# Patient Record
Sex: MALE | Race: WHITE
[De-identification: names, ages, dates, MRNs, and addresses within clinical notes are randomized per-mention and may not be internally consistent; named-entity substitution may affect disease eponyms.]

---

## 2017-04-04 ENCOUNTER — HOSPITAL ENCOUNTER (INPATIENT)
Dept: HOSPITAL 10 - E/R | Age: 52
LOS: 3 days | Discharge: HOME | DRG: 194 | End: 2017-04-07
Attending: FAMILY MEDICINE | Admitting: FAMILY MEDICINE
Payer: MEDICAID

## 2017-04-04 VITALS
HEIGHT: 67 IN | WEIGHT: 167.77 LBS | BODY MASS INDEX: 26.33 KG/M2 | BODY MASS INDEX: 26.33 KG/M2 | WEIGHT: 167.77 LBS | HEIGHT: 67 IN

## 2017-04-04 DIAGNOSIS — J18.9: Primary | ICD-10-CM

## 2017-04-04 DIAGNOSIS — J45.909: ICD-10-CM

## 2017-04-04 DIAGNOSIS — E78.5: ICD-10-CM

## 2017-04-04 DIAGNOSIS — K92.0: ICD-10-CM

## 2017-04-04 DIAGNOSIS — J20.9: ICD-10-CM
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
## 2017-04-04 DIAGNOSIS — F17.210: ICD-10-CM

## 2017-04-04 DIAGNOSIS — J44.9: ICD-10-CM

## 2017-04-04 LAB
ADD SCAN DIFF: NO
ALBUMIN SERPL-MCNC: 4.4 G/DL (ref 3.3–4.9)
ALBUMIN/GLOB SERPL: 1.37 {RATIO}
ALP SERPL-CCNC: 126 IU/L (ref 42–121)
ALT SERPL-CCNC: 34 IU/L (ref 13–69)
ANION GAP SERPL CALC-SCNC: 15 MMOL/L (ref 8–16)
APTT BLD: 35.4 SEC (ref 25–35)
AST SERPL-CCNC: 28 IU/L (ref 15–46)
BASOPHILS # BLD AUTO: 0.1 10^3/UL (ref 0–0.1)
BASOPHILS NFR BLD: 0.3 % (ref 0–2)
BILIRUB DIRECT SERPL-MCNC: 0 MG/DL (ref 0–0.2)
BILIRUB SERPL-MCNC: 0.3 MG/DL (ref 0.2–1.3)
BUN SERPL-MCNC: 12 MG/DL (ref 7–20)
CALCIUM SERPL-MCNC: 8.6 MG/DL (ref 8.4–10.2)
CHLORIDE SERPL-SCNC: 104 MMOL/L (ref 97–110)
CO2 SERPL-SCNC: 24 MMOL/L (ref 21–31)
CREAT SERPL-MCNC: 0.86 MG/DL (ref 0.61–1.24)
EOSINOPHIL # BLD: 0.1 10^3/UL (ref 0–0.5)
EOSINOPHIL NFR BLD: 0.5 % (ref 0–7)
ERYTHROCYTE [DISTWIDTH] IN BLOOD BY AUTOMATED COUNT: 12.5 % (ref 11.5–14.5)
GLOBULIN SER-MCNC: 3.2 G/DL (ref 1.3–3.2)
GLUCOSE SERPL-MCNC: 89 MG/DL (ref 70–220)
HCT VFR BLD CALC: 41.9 % (ref 42–52)
HGB BLD-MCNC: 14.6 G/DL (ref 14–18)
INR PPP: 1.01
LYMPHOCYTES # BLD AUTO: 4.9 10^3/UL (ref 0.8–2.9)
LYMPHOCYTES NFR BLD AUTO: 27.4 % (ref 15–51)
MCH RBC QN AUTO: 31.8 PG (ref 29–33)
MCHC RBC AUTO-ENTMCNC: 34.8 G/DL (ref 32–37)
MCV RBC AUTO: 91.3 FL (ref 82–101)
MONOCYTES # BLD: 1.2 10^3/UL (ref 0.3–0.9)
MONOCYTES NFR BLD: 6.5 % (ref 0–11)
NEUTROPHILS # BLD: 11.6 10^3/UL (ref 1.6–7.5)
NEUTROPHILS NFR BLD AUTO: 65 % (ref 39–77)
NRBC # BLD MANUAL: 0 10^3/UL (ref 0–0)
NRBC BLD QL: 0 /100WBC (ref 0–0)
PLATELET # BLD: 211 10^3/UL (ref 140–415)
PMV BLD AUTO: 10.2 FL (ref 7.4–10.4)
POTASSIUM SERPL-SCNC: 3.9 MMOL/L (ref 3.5–5.1)
PROT SERPL-MCNC: 7.6 G/DL (ref 6.1–8.1)
PROTHROMBIN TIME: 13.3 SEC (ref 12.2–14.2)
PT RATIO: 1
RBC # BLD AUTO: 4.59 10^6/UL (ref 4.7–6.1)
SODIUM SERPL-SCNC: 139 MMOL/L (ref 135–144)
WBC # BLD AUTO: 17.9 10^3/UL (ref 4.8–10.8)

## 2017-04-04 PROCEDURE — 81003 URINALYSIS AUTO W/O SCOPE: CPT

## 2017-04-04 PROCEDURE — 36415 COLL VENOUS BLD VENIPUNCTURE: CPT

## 2017-04-04 PROCEDURE — 83605 ASSAY OF LACTIC ACID: CPT

## 2017-04-04 PROCEDURE — 80061 LIPID PANEL: CPT

## 2017-04-04 PROCEDURE — 96365 THER/PROPH/DIAG IV INF INIT: CPT

## 2017-04-04 PROCEDURE — 71260 CT THORAX DX C+: CPT

## 2017-04-04 PROCEDURE — 85610 PROTHROMBIN TIME: CPT

## 2017-04-04 PROCEDURE — 87086 URINE CULTURE/COLONY COUNT: CPT

## 2017-04-04 PROCEDURE — 84443 ASSAY THYROID STIM HORMONE: CPT

## 2017-04-04 PROCEDURE — 71010: CPT

## 2017-04-04 PROCEDURE — 94640 AIRWAY INHALATION TREATMENT: CPT

## 2017-04-04 PROCEDURE — 87536 HIV-1 QUANT&REVRSE TRNSCRPJ: CPT

## 2017-04-04 PROCEDURE — 85025 COMPLETE CBC W/AUTO DIFF WBC: CPT

## 2017-04-04 PROCEDURE — 83036 HEMOGLOBIN GLYCOSYLATED A1C: CPT

## 2017-04-04 PROCEDURE — 81001 URINALYSIS AUTO W/SCOPE: CPT

## 2017-04-04 PROCEDURE — 86360 T CELL ABSOLUTE COUNT/RATIO: CPT

## 2017-04-04 PROCEDURE — 87400 INFLUENZA A/B EACH AG IA: CPT

## 2017-04-04 PROCEDURE — 87040 BLOOD CULTURE FOR BACTERIA: CPT

## 2017-04-04 PROCEDURE — 94664 DEMO&/EVAL PT USE INHALER: CPT

## 2017-04-04 PROCEDURE — 83735 ASSAY OF MAGNESIUM: CPT

## 2017-04-04 PROCEDURE — 93005 ELECTROCARDIOGRAM TRACING: CPT

## 2017-04-04 PROCEDURE — 84100 ASSAY OF PHOSPHORUS: CPT

## 2017-04-04 PROCEDURE — 80048 BASIC METABOLIC PNL TOTAL CA: CPT

## 2017-04-04 PROCEDURE — 80053 COMPREHEN METABOLIC PANEL: CPT

## 2017-04-04 PROCEDURE — 84439 ASSAY OF FREE THYROXINE: CPT

## 2017-04-04 PROCEDURE — 85730 THROMBOPLASTIN TIME PARTIAL: CPT

## 2017-04-04 PROCEDURE — 84484 ASSAY OF TROPONIN QUANT: CPT

## 2017-04-04 NOTE — RADRPT
PROCEDURE:   XR Chest. 

 

CLINICAL INDICATION:   Possible sepsis.

 

TECHNIQUE:   Single frontal view  of the chest was obtained 

 

COMPARISON:   Chest dated 12/23/2016.

 

FINDINGS:

The heart and mediastinum are within normal limits.  

Mild nodular air space disease over the right mid lung, and this may represent bronchiolitis.  Consi
ravi CT correlation.  Lungs otherwise clear.

There is no pleural effusion or pneumothorax.   

 

IMPRESSION:

1.  Mild nodular air space disease over the right mid lung and consider CT correlation.

2.  Otherwise, no evident acute cardiopulmonary disease.

 

RPTAT: UU

_____________________________________________ 

Physician Guido           Date    Time 

Electronically viewed and signed by Physician Guido on 04/04/2017 23:18 

 

D:  04/04/2017 23:18  T:  04/04/2017 23:18

RS/

## 2017-04-05 VITALS — TEMPERATURE: 99.9 F

## 2017-04-05 VITALS — DIASTOLIC BLOOD PRESSURE: 62 MMHG | RESPIRATION RATE: 20 BRPM | SYSTOLIC BLOOD PRESSURE: 120 MMHG

## 2017-04-05 VITALS — HEART RATE: 94 BPM

## 2017-04-05 VITALS — DIASTOLIC BLOOD PRESSURE: 78 MMHG | RESPIRATION RATE: 21 BRPM | SYSTOLIC BLOOD PRESSURE: 129 MMHG

## 2017-04-05 VITALS — SYSTOLIC BLOOD PRESSURE: 117 MMHG | RESPIRATION RATE: 18 BRPM | DIASTOLIC BLOOD PRESSURE: 71 MMHG

## 2017-04-05 LAB
ADD SCAN DIFF: NO
ADD UMIC: YES
ANION GAP SERPL CALC-SCNC: 14 MMOL/L (ref 8–16)
BASOPHILS # BLD AUTO: 0.1 10^3/UL (ref 0–0.1)
BASOPHILS NFR BLD: 0.3 % (ref 0–2)
BUN SERPL-MCNC: 10 MG/DL (ref 7–20)
CALCIUM SERPL-MCNC: 8.1 MG/DL (ref 8.4–10.2)
CHLORIDE SERPL-SCNC: 107 MMOL/L (ref 97–110)
CO2 SERPL-SCNC: 23 MMOL/L (ref 21–31)
COLOR UR: (no result)
CREAT SERPL-MCNC: 0.78 MG/DL (ref 0.61–1.24)
EOSINOPHIL # BLD: 0.1 10^3/UL (ref 0–0.5)
EOSINOPHIL NFR BLD: 0.6 % (ref 0–7)
ERYTHROCYTE [DISTWIDTH] IN BLOOD BY AUTOMATED COUNT: 12.4 % (ref 11.5–14.5)
GLUCOSE SERPL-MCNC: 89 MG/DL (ref 70–220)
GLUCOSE UR STRIP-MCNC: NEGATIVE %
HCT VFR BLD CALC: 37.7 % (ref 42–52)
HGB BLD-MCNC: 13.2 G/DL (ref 14–18)
KETONES UR STRIP.AUTO-MCNC: NEGATIVE MG/DL
LYMPHOCYTES # BLD AUTO: 4.2 10^3/UL (ref 0.8–2.9)
LYMPHOCYTES NFR BLD AUTO: 27.1 % (ref 15–51)
MCH RBC QN AUTO: 31.9 PG (ref 29–33)
MCHC RBC AUTO-ENTMCNC: 35 G/DL (ref 32–37)
MCV RBC AUTO: 91.1 FL (ref 82–101)
MONOCYTES # BLD: 1 10^3/UL (ref 0.3–0.9)
MONOCYTES NFR BLD: 6.3 % (ref 0–11)
NEUTROPHILS # BLD: 10 10^3/UL (ref 1.6–7.5)
NEUTROPHILS NFR BLD AUTO: 65.3 % (ref 39–77)
NITRITE UR QL STRIP.AUTO: NEGATIVE
NRBC # BLD MANUAL: 0 10^3/UL (ref 0–0)
NRBC BLD QL: 0 /100WBC (ref 0–0)
PLATELET # BLD: 192 10^3/UL (ref 140–415)
PMV BLD AUTO: 9.8 FL (ref 7.4–10.4)
POTASSIUM SERPL-SCNC: 3.8 MMOL/L (ref 3.5–5.1)
RBC # BLD AUTO: 4.14 10^6/UL (ref 4.7–6.1)
RBC # UR AUTO: (no result) /UL
RBC #/AREA URNS HPF: (no result) /HPF
SODIUM SERPL-SCNC: 140 MMOL/L (ref 135–144)
TROPONIN I SERPL-MCNC: < 0.01 NG/ML (ref 0–0.12)
URINE BILIRUBIN (DIP): NEGATIVE
URINE TOTAL PROTEIN (DIP): NEGATIVE
UROBILINOGEN UR STRIP-ACNC: (no result) (ref 0.1–1)
WBC # BLD AUTO: 15.3 10^3/UL (ref 4.8–10.8)
WBC # UR STRIP: NEGATIVE /UL

## 2017-04-05 RX ADMIN — ENOXAPARIN SODIUM SCH MG: 100 INJECTION SUBCUTANEOUS at 10:02

## 2017-04-05 RX ADMIN — IPRATROPIUM BROMIDE AND ALBUTEROL SULFATE SCH ML: .5; 3 SOLUTION RESPIRATORY (INHALATION) at 08:17

## 2017-04-05 RX ADMIN — IPRATROPIUM BROMIDE AND ALBUTEROL SULFATE SCH ML: .5; 3 SOLUTION RESPIRATORY (INHALATION) at 13:59

## 2017-04-05 RX ADMIN — IPRATROPIUM BROMIDE AND ALBUTEROL SULFATE SCH ML: .5; 3 SOLUTION RESPIRATORY (INHALATION) at 05:42

## 2017-04-05 RX ADMIN — IPRATROPIUM BROMIDE AND ALBUTEROL SULFATE SCH ML: .5; 3 SOLUTION RESPIRATORY (INHALATION) at 20:02

## 2017-04-05 RX ADMIN — FAMOTIDINE SCH MG: 20 TABLET ORAL at 20:23

## 2017-04-05 RX ADMIN — FAMOTIDINE SCH MG: 20 TABLET ORAL at 09:57

## 2017-04-05 RX ADMIN — IPRATROPIUM BROMIDE AND ALBUTEROL SULFATE SCH ML: .5; 3 SOLUTION RESPIRATORY (INHALATION) at 17:00

## 2017-04-05 NOTE — ERA
ER Documentation


Chief Complaint


Date/Time


DATE: 17 


TIME: 00:21


Chief Complaint


FEVER WITH HEMOPTYSIS AND CHEST PAIN X 1 WEEK. HIV+





HPI


This is a 51-year-old male with fever and hemoptysis and chest pain for 1 week.

  Patient is HIV positive.  Cough is also mildly productive.  Patient says 

unknown viral load.  Unknown CD4 count.  No other current complaints.  Mild 

associated shortness of breath with coughing.





ROS


All systems reviewed and are negative except as per history of present illness.





Medications


Home Meds


Active Scripts


Azithromycin* (Azithromycin*) 250 Mg Tablet, 500 MG PO ONCE, #1 TAB


   Prov:ANA SAWYER MD         16


Azithromycin* (Azithromycin*) 250 Mg Tablet, 250 MG PO DAILY, #4 TAB


   Prov:ANA SAWYER MD         16


Albuterol Sulfate* (Proair HFA*) 8.5 Gm Hfa.aer.ad, 2 PUFF INH Q6H Y for 

WHEEZING AND SOB, #1 INHALER


   Prov:ANA SAWYER MD         16


Reported Medications


Elvitegr/Cobicist/Emtric/Tenof (STRIBILD TABLET) 1 Each Tablet, 1 EACH PO DAILY

, TAB


   16





Allergies


Allergies:  


Coded Allergies:  


     morphine (Verified  Allergy, Unknown, rash, 16)





PMhx/Soc


History of Surgery:  No


Hx Respiratory Disorders:  Yes (asthma)


Hx Miscellaneous Medical Probl:  Yes (HIV)


Hx Alcohol Use:  No


Hx Substance Use:  No


Hx Tobacco Use:  Yes


Smoking Status:  Light tobacco smoker





Physical Exam


Vitals





Vital Signs








  Date Time  Temp Pulse Resp B/P Pulse Ox O2 Delivery O2 Flow Rate FiO2


 


17 23:52  96 20  97   21


 


17 23:00 99.9       


 


17 22:55  95 18 130/82 99 Room Air  


 


17 19:53 100.5 117 20 138/79 97   








Physical Exam


Const:  []


Head:   Atraumatic 


Eyes:    Normal Conjunctiva


ENT:    Normal External Ears, Nose and Mouth.


Neck:               Full range of motion..~ No meningismus.


Resp:    Clear to auscultation bilaterally


Cardio:    Regular rate and rhythm, no murmurs


Abd:    Soft, non tender, non distended. Normal bowel sounds


Skin:    No petechiae or rashes


Back:    No midline or flank tenderness


Ext:    No cyanosis, or edema


Neur:    Awake and alert


Psych:    Normal Mood and Affect


Result Diagram:  


17 2315                                                                    

            175





Results 24 hrs





 Laboratory Tests








Test


  17


23:15


 


White Blood Count 17.910^3/ul 


 


Red Blood Count 4.5910^6/ul 


 


Hemoglobin 14.6g/dl 


 


Hematocrit 41.9% 


 


Mean Corpuscular Volume 91.3fl 


 


Mean Corpuscular Hemoglobin 31.8pg 


 


Mean Corpuscular Hemoglobin


Concent 34.8g/dl 


 


 


Red Cell Distribution Width 12.5% 


 


Platelet Count 15238^3/UL 


 


Mean Platelet Volume 10.2fl 


 


Neutrophils % 65.0% 


 


Lymphocytes % 27.4% 


 


Monocytes % 6.5% 


 


Eosinophils % 0.5% 


 


Basophils % 0.3% 


 


Nucleated Red Blood Cells % 0.0/100WBC 


 


Neutrophils # 11.610^3/ul 


 


Lymphocytes # 4.910^3/ul 


 


Monocytes # 1.210^3/ul 


 


Eosinophils # 0.110^3/ul 


 


Basophils # 0.110^3/ul 


 


Nucleated Red Blood Cells # 0.010^3/ul 


 


Prothrombin Time 13.3Sec 


 


Prothrombin Time Ratio 1.0 


 


INR International Normalized


Ratio 1.01 


 


 


Activated Partial


Thromboplast Time 35.4Sec 


 


 


Sodium Level 139mmol/L 


 


Potassium Level 3.9mmol/L 


 


Chloride Level 104mmol/L 


 


Carbon Dioxide Level 24mmol/L 


 


Anion Gap 15 


 


Blood Urea Nitrogen 12mg/dl 


 


Creatinine 0.86mg/dl 


 


Glucose Level 89mg/dl 


 


Lactic Acid Level 0.9mmol/L 


 


Calcium Level 8.6mg/dl 


 


Total Bilirubin 0.3mg/dl 


 


Direct Bilirubin 0.00mg/dl 


 


Indirect Bilirubin 0.3mg/dl 


 


Aspartate Amino Transf


(AST/SGOT) 28IU/L 


 


 


Alanine Aminotransferase


(ALT/SGPT) 34IU/L 


 


 


Alkaline Phosphatase 126IU/L 


 


Troponin I < 0.010ng/ml 


 


Total Protein 7.6g/dl 


 


Albumin 4.4g/dl 


 


Globulin 3.20g/dl 


 


Albumin/Globulin Ratio 1.37 








 Current Medications








 Medications


  (Trade)  Dose


 Ordered  Sig/Loc


 Route


 PRN Reason  Start Time


 Stop Time Status Last Admin


Dose Admin


 


 Sodium Chloride


  (NS)  2,430 ml  BOLUS OVER 2 HOURS STAT


 IV*


   17 22:46


 17 22:48 DC 17 23:12


 


 


 Acetaminophen


  (Tylenol Tab)  650 mg  ONCE  STAT


 PO


   17 22:46


 17 22:48 DC 17 23:11


 


 


 Albuterol


  (Proventil


 0.083% (Neb))  5 mg  ONCE  STAT


 NEB


   17 23:45


 17 23:46 DC 17 23:51


 


 


 Ipratropium


 Bromide


  (Atrovent 0.02%


  (Neb))  0.5 mg  ONCE  STAT


 NEB


   17 23:45


 17 23:46 DC 17 23:51


 


 


 Ipratropium


 Bromide


  (Atrovent 0.02%


  (Neb))  0.5 mg  STK-MED ONCE


 .ROUTE


   17 23:46


 17 23:47 DC  


 


 


 Albuterol


  (Proventil 0.5%


  (Neb))  2.5 mg  STK-MED ONCE


 .ROUTE


   17 23:46


 17 23:47 DC  


 











Procedures/MDM


EKG: 


Rate/Rhythm:             [Normal Sinus Rhythm]


QRS, ST, T-waves:    [No changes consistent w/ acute ischemia]


Impression:      [No evidence of ischemia or arrhythmia]





Chest X-ray 1V Interpreted by me:


Soft Tissue:                                               No acute 

abnormalities


Bones:                                                    No acute abnormalities


Mediastinum/Cardiac Silhouette/Lungs:     Nodular airspace disease.  Impression

: Possible HIV related pneumonia





Medical decision-makin-year-old gentleman who has evidence of airspace 

disease.  Patient will be admitted to hospitalist.  Started on Rocephin 

azithromycin and Bactrim to cover for pneumocystis disease.





Departure


Diagnosis:  


 Primary Impression:  


 Pneumonia


 Qualified Code:  J18.9 - Pneumonia due to infectious organism, unspecified 

laterality, unspecified part of lung


Condition:  Serious











WINSOME BETTENCOURT 2017 00:22

## 2017-04-05 NOTE — RADRPT
PROCEDURE:   CT Chest with contrast. 

 

CLINICAL INDICATION:   Abnormal CXR, CT Recommended. Febrile pt with HIV

 

TECHNIQUE:   CT scan of the chest with contrast was performed on a multidetector high-resolution CT 
scan.  The patient was scanned following the uncomplicated intravenous administration of 80 ml Omnip
aque-300. Coronal and sagittal reformatted images were obtained from the axial source images. Standa
rd CT scan of the chest with contrast protocols were performed.  

 

The total exam CTDI equals 11.46 mGy and the total exam DLP equals 477.41 mGy-cm. 

 

One or more of the following dose reduction techniques were used:

- Automated exposure control.

- Adjustment of the mA and/or kV according to patient size.

Use of iterative reconstruction technique.

 

COMPARISON:   Chest series 04/04/2017 

 

FINDINGS:   

 

There are multiple blebs involving the peripheral upper lungs and apices.  Mild dependent lung atele
ctasis.  On the previous recent chest x-ray there was described mild nodular air space disease of th
e right mid lung which probably represents small pulmonary vessels on end.  Specifically there is no
 evidence of pulmonary nodules bilaterally. The lungs are otherwise clear without evidence of infilt
rates.  The heart is within normal limits in size without evidence of pericardial effusion.  No evid
ence of pleural effusions or pneumothoraces. There is mild paratracheal lymphadenopathy.  No evidenc
e of hilar, axillary or paraclavicular lymphadenopathy.  The aorta is unremarkable.  Images of the u
pper abdomen are unremarkable.  There is degenerative changes lower cervical thoracic and upper lumb
ar spine.  No acute osseous findings.  No osteoblastic/osteolytic lesions.

 

IMPRESSION:

 

1.  Previous chest x-ray 04/04/2017 indicated nodular air space disease involving the right mid lung
 likely representing pulmonary vessels on end.

2.  Specifically, no evidence of pulmonary nodules.

3.  Mild paratracheal lymphadenopathy.  No evidence of hilar or axillary lymphadenopathy.

4.  Multiple blebs involving the peripheral upper lungs and apical regions.  Dependent atelectasis.

5.  No evidence of thoracic effusions.

 

 

RPTAT:AAJJ

_____________________________________________ 

Physician Yassine           Date    Time 

Electronically viewed and signed by Physician Yassine on 04/05/2017 09:12 

 

D:  04/05/2017 09:12  T:  04/05/2017 09:12

/

## 2017-04-05 NOTE — CONS
DATE OF ADMISSION: 04/05/2017

DATE OF CONSULTATION:  04/05/2017

 

 

 

TYPE OF CONSULTATION:  Infectious Disease.

 

REASON FOR CONSULTATION:  Antibiotic management.

 

HISTORY OF PRESENT ILLNESS:  Khang Lobo is a 51-year-old  male who comes into the em
ergency room with fever and hemoptysis as well as chest pain for 1 week.  He  has a history of HIV p
ositivity.  The patient does not know his CD4 count or his viral load.  He is currently on Stribild 
which is tenofovir, emtricitabine, cobicistat and elvitegravir which he has been sign on for a perio
d of time 1 tablet a day.  His problems also include asthma.  He is a light tobacco smoker.  On admi
ssion, his white count was 17.9, H and H 14.6 and 41.9, platelet count 211,000.  BUN and creatinine 
12/0.86.

 

PAST MEDICAL HISTORY:  Operations as outlined.

 

FAMILY HISTORY:  Noncontributory.

 

SOCIAL HISTORY:  Does not smoke, drink or abuse drugs.

 

ALLERGIES:  NONE TO PENICILLIN, SULFA OR FOODS.

 

MEDICATIONS:  Per chart.

 

REVIEW OF SYSTEMS:  As per HPI.

 

PHYSICAL EXAMINATION:

GENERAL:  The patient is a well-developed, well-nourished male who is alert, responsive, in no acute
 distress.

VITAL SIGNS:  Stable.  He is afebrile.  T-max 100.5.

SKIN:  Without generalized rash.

HEENT:  Within normal limits.

NECK:  Supple.

LYMPH NODES:  None palpable.

CHEST:  Decreased breath sounds at the bases.

HEART:  Without murmur or gallop.

ABDOMEN:  Soft, nontender, without organosplenomegaly or masses.

EXTREMITIES:  Without cyanosis, clubbing, or edema.

RECTAL AND GENITAL:  Deferred.

NEUROLOGIC:  No focal neurological abnormalities.  

 

Chest x-ray shows no acute abnormalities.  He does have a nodular airspace disease.

 

IMPRESSION AND PLAN:  Possible HIV related pneumonia.  Patient was started on Rocephin, azithromycin
 and Bactrim.  A CT scan of the chest was done which shows nodular airspace disease involving the ri
ght mid lung, likely representing pulmonary vessels.  Specifically, no evidence of pulmonary nodules
.  Mild paratracheal lymphadenopathy, no evidence of hilar or axillary lymphadenopathy.  Multiple bl
ebs involving peripheral upper lungs in apical regions, dependent atelectasis.  No evidence of thora
cic effusions.  So it is unlikely that these are actually nodules.  Microbiology:  His influenza A a
nd B are negative.  I doubt very much that he has pneumocystis.  We have to await the results of his
 CD4 count and viral load.  I see lymphocyte subset and a HIV RNA by PCR was ordered as well.  Cee apple was placed also on azithromycin and ceftriaxone as I noted, as well as received 1 dose of IV Bact
rim, but I do not think we are treating pneumocystis.  I will dictate my findings to the hospitalist
.

 

 

Dictated By: JERROLD DREYER MD JD/ALLISON

DD:    04/05/2017 17:34:23

DT:    04/05/2017 18:42:56

Conf#: [id number]

DID#:  [id number]

## 2017-04-05 NOTE — HP
Date/Time of Note


Date/Time of Note


DATE: 4/5/17 


TIME: 00:46





Assessment/Plan


VTE Prophylaxis


VTE Prophylaxis Intervention:  anti-embolic stocking





Assessment/Plan


Assessment/Plan


1)  Fever and Cough with Leukocytosis of 17.9, but no Left Shift. 1VCXR shows 

no evidence of acute cardiopulmonary disease, so it is not clear that there is 

a pneumonia going on, so doubt Opportunistic Pulmonary Infection


-  Admit to Med-Surg


-  Monitor and Evaluate as indicated


-  CT Chest to further evaluate the abnormal findings on the CXR


-  CBC in AM


2)  Hematemesis


-  Consider GI Referral vs Pulmonary Referral as it is not 100% clear if oral 

bleeding is from vomiting or from coughing/ GI vs Pulmonary source


3)  Asthma with possible Asthmatic Bronchitis


-  DuoNeb Q 4 hours


-  Consider Steroid blast


4)  HIV Positive


-  Viral Load. Need to assess patient's current immune status





HPI/ROS


Admit Date/Time


Admit Date/Time


04/05/17   0009





Hx of Present Illness


Patient is a 51 year old  male with HIV who gets his medical care by 

Dr. Osorio at the [facility] [place]. He presents with a complaint 

of fever, chills and cough for the past week. He states this is his 4th visit 

to the ER in the past 4 months. He states his Viral Load is 1400 and he takes 

his Antiretroviral medication daily. He has never had Pneumonia, but he does 

have Asthma. He smokes 2 cigarettes daily.





He mentions that he has been nauseated but has not vomited. His phlegm has had 

blood init for the past 15 days. He denies any red blood MT or black-colored 

stools





ROS


General:  Admits:  Fever, Chills


              Denies:  Poor Appetite, Generalized Body Aches


Eyes:  Admits:


          Denies:  Blurry Vision, Double Vision


HENT:  Admits:


           Denies:  Ear Pain/Pressure, Runny/Stuffy Nose, Sore Throat


Cardiovascular:  Admits:


                        Denies:  Chest Pain, Palpitations, Leg Swelling


Pulmonary:  Admits:  Cough, Phlegm with blood for the past 15 days


                  Denies:   Wheeze, Shortness of Breath


Gastrointestinal:  Admits:  Nausea


                          Denies:  Abdominal Pain, Vomiting, Diarrhea, Blood in 

Stool, Black-Colored Stool


Urogenital:  Admits:


                  Denies:  Burning with Urination, Urinary Frequency, Blood in 

Urine


Musculoskeletal:  Admits:


                          Denies:  Joint Pain, Joint Swelling, Muscle Pain


Neurological:  Admits:


                     Denies:  Headache, Dizziness, Numbness, Tingling, Shooting 

Pains


Integumentary:  Admits:


                         Denies:  Rash, Itch





PMH/Family/Social


Past Medical History


HIV; Asthma





Past Surgical History


Past Surgical Hx:  appendectomy (30 years ago), other (Eye Surgery)





Social History


Alcohol Use:  none


Smoking Status:  Light tobacco smoker (2 Cigarettes per day)


Drug Use:  none





Exam/Review of Systems


Vital Signs


Vitals





 Vital Signs








  Date Time  Temp Pulse Resp B/P Pulse Ox O2 Delivery O2 Flow Rate FiO2


 


4/4/17 23:52  96 20  97   21


 


4/4/17 23:00 99.9       


 


4/4/17 22:55    130/82  Room Air  











Exam


Exam


General:  Sleeping 51 year old [place]-Speaking male, rouses easily. Appears 

tired, but non-toxic and in no acute distress.


Eyes:  Sclera White, EOMI


HENT:  Normocephalic/Atraumatic, External Ears/Nose Normal, Moist Mucus 

Membranes


Neck:  Supple, Trachea Midline


Cardiovascular:  Normal Rate, Regular Rhythm, Normal S1 and S2, No Murmur, No 

Extra Sounds. Radial pulse +2/4. No pedal Edema.


Pulmonary:  Decreased airflow throughout, Normal Respiratory Effort, but easy 

and multiple dry coughs in a row with even a moderate inspiration.Coarse sounds 

notec, but No Rales, Rhonchi or Wheezes


Gastrointestinal:  Normoactive Bowel Sounds, Soft, Non-Tender/Non-Distended, No 

Hepatosplenomegaly Appreciated, No Pulsatile Masses


Urogenital:  Deferred


Musculoskeletal:  Normal Muscle Bulk and Tone


Neurological:  CN  II - XII Grossly Intact, Non-Focal, Speech Normal


Integumentary:  Normal Moisture and Temperature, Good Turgor, No Jaundice, No 

Rash.


Lymphatic:  No Cervical, Supraclavicular, Axillary or Inguinal Lymphadenopathy


Psychiatric:  Appropriate Mood and Affect, Good Eye Contact





Labs


Result Diagram:  


4/4/17 2315                                                                    

            4/4/17 2315








Medications


Medications


Home Meds


Active Scripts


Azithromycin* (Azithromycin*) 250 Mg Tablet, 500 MG PO ONCE, #1 TAB


   Prov:ANA SAWYER MD         12/23/16


Azithromycin* (Azithromycin*) 250 Mg Tablet, 250 MG PO DAILY, #4 TAB


   Prov:ANA SAWYER MD         12/23/16


Albuterol Sulfate* (Proair HFA*) 8.5 Gm Hfa.aer.ad, 2 PUFF INH Q6H Y for 

WHEEZING AND SOB, #1 INHALER


   Prov:ANA SAWYER MD         12/23/16


Reported Medications


Elvitegr/Cobicist/Emtric/Tenof (STRIBILD TABLET) 1 Each Tablet, 1 EACH PO DAILY

, TAB


   12/23/16


 Current Medications


Trimethoprim/ Sulfamethoxazole/ Dextrose (Bactrim/D5W) 510 ml @  350 mls/hr 

ONCE  ONCE IVPB ;  Start 4/5/17 at 00:30;  Stop 4/5/17 at 01:57





Procedures


Procedures





 Laboratory Tests








Test


  4/4/17


23:15


 


White Blood Count 17.910^3/ul 


 


Red Blood Count 4.5910^6/ul 


 


Hemoglobin 14.6g/dl 


 


Hematocrit 41.9% 


 


Mean Corpuscular Volume 91.3fl 


 


Mean Corpuscular Hemoglobin 31.8pg 


 


Mean Corpuscular Hemoglobin


Concent 34.8g/dl 


 


 


Red Cell Distribution Width 12.5% 


 


Platelet Count 14498^3/UL 


 


Mean Platelet Volume 10.2fl 


 


Neutrophils % 65.0% 


 


Lymphocytes % 27.4% 


 


Monocytes % 6.5% 


 


Eosinophils % 0.5% 


 


Basophils % 0.3% 


 


Nucleated Red Blood Cells % 0.0/100WBC 


 


Neutrophils # 11.610^3/ul 


 


Lymphocytes # 4.910^3/ul 


 


Monocytes # 1.210^3/ul 


 


Eosinophils # 0.110^3/ul 


 


Basophils # 0.110^3/ul 


 


Nucleated Red Blood Cells # 0.010^3/ul 


 


Prothrombin Time 13.3Sec 


 


Prothrombin Time Ratio 1.0 


 


INR International Normalized


Ratio 1.01 


 


 


Activated Partial


Thromboplast Time 35.4Sec 


 


 


Sodium Level 139mmol/L 


 


Potassium Level 3.9mmol/L 


 


Chloride Level 104mmol/L 


 


Carbon Dioxide Level 24mmol/L 


 


Anion Gap 15 


 


Blood Urea Nitrogen 12mg/dl 


 


Creatinine 0.86mg/dl 


 


Glucose Level 89mg/dl 


 


Lactic Acid Level 0.9mmol/L 


 


Calcium Level 8.6mg/dl 


 


Total Bilirubin 0.3mg/dl 


 


Direct Bilirubin 0.00mg/dl 


 


Indirect Bilirubin 0.3mg/dl 


 


Aspartate Amino Transf


(AST/SGOT) 28IU/L 


 


 


Alanine Aminotransferase


(ALT/SGPT) 34IU/L 


 


 


Alkaline Phosphatase 126IU/L 


 


Troponin I < 0.010ng/ml 


 


Total Protein 7.6g/dl 


 


Albumin 4.4g/dl 


 


Globulin 3.20g/dl 


 


Albumin/Globulin Ratio 1.37 








EKG:


Rate/Rhythm:             Sinus Tach at 115 BPM


QRS, ST, T-waves:    No Acute Changes


Impression:      Abnormal EKG








PROCEDURE:   XR Chest. 


 


CLINICAL INDICATION:   Possible sepsis.


 


TECHNIQUE:   Single frontal view  of the chest was obtained 


 


COMPARISON:   Chest dated 12/23/2016.


 


FINDINGS:


The heart and mediastinum are within normal limits.  


Mild nodular air space disease over the right mid lung, and this may represent 

bronchiolitis.  Consider CT correlation.  Lungs otherwise clear.


There is no pleural effusion or pneumothorax.   


 


IMPRESSION:


1.  Mild nodular air space disease over the right mid lung and consider CT 

correlation.


2.  Otherwise, no evident acute cardiopulmonary disease.











CHRISTINE SHANKS DO Apr 5, 2017 00:56

## 2017-04-05 NOTE — CONS
DATE OF ADMISSION: 04/05/2017

DATE OF CONSULTATION:  04/05/2017

 

 

 

ADDENDUM

 

On further questioning, the patient states that his CD4 count is 1400, so he has an excellent CD4 co
unt and, therefore, pneumocystis is out of the question.  He probably just has a viral syndrome and 
currently is on azithromycin and ceftriaxone.

 

 

Dictated By: JERROLD DREYER MD JD/ALLISON

DD:    04/05/2017 17:38:30

DT:    04/05/2017 18:44:15

Conf#: [id number]

DID#:  [id number]

## 2017-04-06 VITALS — RESPIRATION RATE: 16 BRPM | SYSTOLIC BLOOD PRESSURE: 125 MMHG | DIASTOLIC BLOOD PRESSURE: 61 MMHG

## 2017-04-06 VITALS — SYSTOLIC BLOOD PRESSURE: 129 MMHG | DIASTOLIC BLOOD PRESSURE: 77 MMHG | RESPIRATION RATE: 20 BRPM

## 2017-04-06 LAB
ADD SCAN DIFF: NO
ANION GAP SERPL CALC-SCNC: 14 MMOL/L (ref 8–16)
BASOPHILS # BLD AUTO: 0 10^3/UL (ref 0–0.1)
BASOPHILS NFR BLD: 0.4 % (ref 0–2)
BUN SERPL-MCNC: 14 MG/DL (ref 7–20)
CALCIUM SERPL-MCNC: 8.6 MG/DL (ref 8.4–10.2)
CHLORIDE SERPL-SCNC: 108 MMOL/L (ref 97–110)
CHOLEST SERPL-MCNC: 188 MG/DL (ref 100–200)
CHOLEST/HDLC SERPL: 5.3 RATIO
CO2 SERPL-SCNC: 23 MMOL/L (ref 21–31)
CREAT SERPL-MCNC: 0.82 MG/DL (ref 0.61–1.24)
EOSINOPHIL # BLD: 0.1 10^3/UL (ref 0–0.5)
EOSINOPHIL NFR BLD: 1 % (ref 0–7)
ERYTHROCYTE [DISTWIDTH] IN BLOOD BY AUTOMATED COUNT: 12.8 % (ref 11.5–14.5)
GLUCOSE SERPL-MCNC: 158 MG/DL (ref 70–220)
HCT VFR BLD CALC: 36.2 % (ref 42–52)
HDLC SERPL-MCNC: 35 MG/DL (ref 28–71)
HGB BLD-MCNC: 12.8 G/DL (ref 14–18)
LYMPHOCYTES # BLD AUTO: 3.7 10^3/UL (ref 0.8–2.9)
LYMPHOCYTES NFR BLD AUTO: 32.9 % (ref 15–51)
MAGNESIUM SERPL-MCNC: 2.2 MG/DL (ref 1.7–2.5)
MCH RBC QN AUTO: 32.7 PG (ref 29–33)
MCHC RBC AUTO-ENTMCNC: 35.4 G/DL (ref 32–37)
MCV RBC AUTO: 92.3 FL (ref 82–101)
MONOCYTES # BLD: 0.6 10^3/UL (ref 0.3–0.9)
MONOCYTES NFR BLD: 5.5 % (ref 0–11)
NEUTROPHILS # BLD: 6.7 10^3/UL (ref 1.6–7.5)
NEUTROPHILS NFR BLD AUTO: 59.8 % (ref 39–77)
NRBC # BLD MANUAL: 0 10^3/UL (ref 0–0)
NRBC BLD QL: 0 /100WBC (ref 0–0)
PHOSPHATE SERPL-MCNC: 3.1 MG/DL (ref 2.5–4.9)
PLATELET # BLD: 189 10^3/UL (ref 140–415)
PMV BLD AUTO: 10 FL (ref 7.4–10.4)
POTASSIUM SERPL-SCNC: 3.4 MMOL/L (ref 3.5–5.1)
RBC # BLD AUTO: 3.92 10^6/UL (ref 4.7–6.1)
SODIUM SERPL-SCNC: 142 MMOL/L (ref 135–144)
TRIGL SERPL-MCNC: 175 MG/DL (ref 0–149)
TSH SERPL-ACNC: 3.49 MIU/L (ref 0.47–4.68)
WBC # BLD AUTO: 11.2 10^3/UL (ref 4.8–10.8)

## 2017-04-06 RX ADMIN — IPRATROPIUM BROMIDE AND ALBUTEROL SULFATE SCH ML: .5; 3 SOLUTION RESPIRATORY (INHALATION) at 20:16

## 2017-04-06 RX ADMIN — IPRATROPIUM BROMIDE AND ALBUTEROL SULFATE SCH ML: .5; 3 SOLUTION RESPIRATORY (INHALATION) at 01:01

## 2017-04-06 RX ADMIN — ENOXAPARIN SODIUM SCH MG: 100 INJECTION SUBCUTANEOUS at 08:32

## 2017-04-06 RX ADMIN — OMEGA-3 FATTY ACIDS CAP DELAYED RELEASE 1000 MG SCH MG: 1000 CAPSULE DELAYED RELEASE at 20:37

## 2017-04-06 RX ADMIN — IPRATROPIUM BROMIDE AND ALBUTEROL SULFATE SCH ML: .5; 3 SOLUTION RESPIRATORY (INHALATION) at 17:21

## 2017-04-06 RX ADMIN — FAMOTIDINE SCH MG: 20 TABLET ORAL at 20:37

## 2017-04-06 RX ADMIN — IPRATROPIUM BROMIDE AND ALBUTEROL SULFATE SCH ML: .5; 3 SOLUTION RESPIRATORY (INHALATION) at 04:28

## 2017-04-06 RX ADMIN — IPRATROPIUM BROMIDE AND ALBUTEROL SULFATE SCH ML: .5; 3 SOLUTION RESPIRATORY (INHALATION) at 12:55

## 2017-04-06 RX ADMIN — IPRATROPIUM BROMIDE AND ALBUTEROL SULFATE SCH ML: .5; 3 SOLUTION RESPIRATORY (INHALATION) at 08:33

## 2017-04-06 RX ADMIN — FAMOTIDINE SCH MG: 20 TABLET ORAL at 08:25

## 2017-04-06 NOTE — PN
DATE:  04/06/2017

 

 

TIME OF EVALUATION:  10:45 a.m. 

 

SUBJECTIVE:  Improved dyspnea.  Complaints of cough.

 

OBJECTIVE DATA:

VITAL SIGNS:  Temperature 98.6, pulse rate 86, respiratory rate 18, blood 
pressure 125/61, oxygen saturation 96% on room air.

GENERAL:  This is a well-built, well-nourished  male lying in bed.

HEENT:  Head normocephalic and atraumatic.  Eyes:  Anicteric sclerae.  
Conjunctivae clear.  ENT:  Nasal septum is midline.  Oral mucosa is dry.

NECK:  Supple.  No JVD noticed.

RESPIRATORY:  Bilaterally diminished breath sounds.  No use of accessory 
muscles of respiration.  No wheezing.

CARDIAC:  Regular rate and rhythm.  S1 and S2 heard.

ABDOMEN:  Soft, nontender, and nondistended.  Bowel sounds positive in all 4 
quadrants.

GENITOURINARY:  Deferred.

EXTREMITIES:  No cyanosis, no clubbing, no edema.  Peripheral pulses are 
palpable.

NEUROLOGIC:  The patient is awake, alert, and oriented.  Cranial nerves are 
grossly intact.

 

LABORATORY AND DIAGNOSTIC DATA:  WBC 11.2, hemoglobin 12.8, hematocrit 36.2, 
platelet count 189.  Sodium 142, potassium 3.4, chloride 108, carbon dioxide 23
, anion gap 14, BUN 14, creatinine 0.82, glucose 150, calcium 8.6, phosphorus 
3.1, magnesium 2.2.

 



ASSESSMENT AND PLAN:

1.  Possible underlying acute tracheobronchitis.  Continue antibiotics.  No 
evidence of any pneumonia as per CT scan.  CT scan reveals emphysematous blebs.
  The patient is a chronic smoker.  Continue inhaled bronchodilators.  Pending 
CD4 count and viral load.  The patient less likely have any PCP.



2.  Dyslipidemia with elevated triglycerides and suboptimal LDL.  Will start 
the patient on fish oil.



3.  Possible underlying chronic obstructive pulmonary disease.  Continue 
inhaled bronchodilators.



4.  Nicotine use.  Cessation advised.



5.  HIV. Continue antiretrovirals.



6.  Fluid, electrolytes, and nutrition.  Low cholesterol diet.



7.  Deep venous thrombosis prophylaxis. Subcutaneous Lovenox.



8.  Gastrointestinal prophylaxis.  Histamine 2 receptor blockers. 

 

Plan: Continue antibiotics.  Cultures so far negative.  Will await CD4 count 
and viral load to be back.  Will await further recommendations from infectious 
disease.  





Case discussed with Dr. Burns.

 

 

PATRICIA BURNS MD, AM/ALLISON

DD:    04/06/2017 10:56:28

DT:    04/06/2017 11:43:59

Conf#: [id number]

DID#:  [id number]

 

MTDD

## 2017-04-06 NOTE — PN
DATE:  04/06/2017

 

 

INFECTIOUS DISEASE PROGRESS NOTE 

 

SUBJECTIVE:  No acute changes.  The patient is alert, comfortable on room air.  No fevers, no shortn
ess of breath.  WBC 11.2.  No shift, no bands.  BUN 14, creatinine 0.82.

 

MICROBIOLOGY:  Blood culture, urine culture and influenza swab negative.  CD4 count pending.

 

ANTIMICROBIALS:  The patient is on:

1.  Zithromax.

2.  He is also on Stribild.  

 

PHYSICAL EXAMINATION:

GENERAL:  This is a well-nourished, well-developed, middle-aged,  man, who is alert, in no d
istress.

HEENT:  Head atraumatic, normocephalic.  Sclerae anicteric.  Buccal mucosa pink.

NECK:  Supple.

CHEST:  Chest rise is symmetrical.  Breath sounds clear.

HEART:  S1, S2.

ABDOMEN:  Soft.  Bowel tones present.

EXTREMITIES:  Without cyanosis.

 

ASSESSMENT:

1.  Community-acquired pneumonia.

2.  Human immunodeficiency virus.

3.  Systemic inflammatory response syndrome.

 

PLAN:  The patient remains stable.  We are going to change antibiotics to oral Levaquin.  Continue H
IV medications.  Await for CD4 count, viral load, and anticipate discharge on oral Levaquin for 7 mo
re days.

 

 

Dictated By: CLARENCE RAGSDALE NP for JERROLD DREYER MD NI/ALLISON

DD:    04/06/2017 15:06:08

DT:    04/06/2017 15:17:21

Conf#: [id number]

DID#:  [id number]

## 2017-04-07 VITALS — DIASTOLIC BLOOD PRESSURE: 63 MMHG | RESPIRATION RATE: 18 BRPM | SYSTOLIC BLOOD PRESSURE: 140 MMHG

## 2017-04-07 LAB
ADD SCAN DIFF: NO
ANION GAP SERPL CALC-SCNC: 16 MMOL/L (ref 8–16)
BASOPHILS # BLD AUTO: 0.1 10^3/UL (ref 0–0.1)
BASOPHILS NFR BLD: 0.5 % (ref 0–2)
BUN SERPL-MCNC: 12 MG/DL (ref 7–20)
CALCIUM SERPL-MCNC: 9.1 MG/DL (ref 8.4–10.2)
CHLORIDE SERPL-SCNC: 107 MMOL/L (ref 97–110)
CO2 SERPL-SCNC: 23 MMOL/L (ref 21–31)
CREAT SERPL-MCNC: 0.8 MG/DL (ref 0.61–1.24)
EOSINOPHIL # BLD: 0.2 10^3/UL (ref 0–0.5)
EOSINOPHIL NFR BLD: 2.2 % (ref 0–7)
ERYTHROCYTE [DISTWIDTH] IN BLOOD BY AUTOMATED COUNT: 12.5 % (ref 11.5–14.5)
GLUCOSE SERPL-MCNC: 91 MG/DL (ref 70–220)
HCT VFR BLD CALC: 38.2 % (ref 42–52)
HGB BLD-MCNC: 13.1 G/DL (ref 14–18)
LYMPHOCYTE - % CD4 (HELPER): 32 % (ref 30–61)
LYMPHOCYTE - %CD8 (SUPPRESSOR): 26 % (ref 12–42)
LYMPHOCYTE - ABSOLUTE CD4: 1366 CELLS/UL (ref 490–1740)
LYMPHOCYTE - ABSOLUTE CD8: 1091 CELLS/UL (ref 180–1170)
LYMPHOCYTE - CD4/CD8 RATIO: 1.25 (ref 0.86–5)
LYMPHOCYTES # BLD AUTO: 4.2 10^3/UL (ref 0.8–2.9)
LYMPHOCYTES NFR BLD AUTO: 37.8 % (ref 15–51)
MAGNESIUM SERPL-MCNC: 2.1 MG/DL (ref 1.7–2.5)
MCH RBC QN AUTO: 32 PG (ref 29–33)
MCHC RBC AUTO-ENTMCNC: 34.3 G/DL (ref 32–37)
MCV RBC AUTO: 93.4 FL (ref 82–101)
MONOCYTES # BLD: 0.7 10^3/UL (ref 0.3–0.9)
MONOCYTES NFR BLD: 6.7 % (ref 0–11)
NEUTROPHILS # BLD: 5.8 10^3/UL (ref 1.6–7.5)
NEUTROPHILS NFR BLD AUTO: 52.4 % (ref 39–77)
NRBC # BLD MANUAL: 0 10^3/UL (ref 0–0)
NRBC BLD QL: 0 /100WBC (ref 0–0)
PHOSPHATE SERPL-MCNC: 4.8 MG/DL (ref 2.5–4.9)
PLATELET # BLD: 210 10^3/UL (ref 140–415)
PMV BLD AUTO: 10 FL (ref 7.4–10.4)
POTASSIUM SERPL-SCNC: 4.3 MMOL/L (ref 3.5–5.1)
RBC # BLD AUTO: 4.09 10^6/UL (ref 4.7–6.1)
SODIUM SERPL-SCNC: 142 MMOL/L (ref 135–144)
WBC # BLD AUTO: 11 10^3/UL (ref 4.8–10.8)

## 2017-04-07 RX ADMIN — IPRATROPIUM BROMIDE AND ALBUTEROL SULFATE SCH ML: .5; 3 SOLUTION RESPIRATORY (INHALATION) at 05:08

## 2017-04-07 RX ADMIN — ENOXAPARIN SODIUM SCH MG: 100 INJECTION SUBCUTANEOUS at 10:25

## 2017-04-07 RX ADMIN — IPRATROPIUM BROMIDE AND ALBUTEROL SULFATE SCH ML: .5; 3 SOLUTION RESPIRATORY (INHALATION) at 09:38

## 2017-04-07 RX ADMIN — FAMOTIDINE SCH MG: 20 TABLET ORAL at 09:31

## 2017-04-07 RX ADMIN — OMEGA-3 FATTY ACIDS CAP DELAYED RELEASE 1000 MG SCH MG: 1000 CAPSULE DELAYED RELEASE at 09:31

## 2017-04-07 RX ADMIN — IPRATROPIUM BROMIDE AND ALBUTEROL SULFATE SCH ML: .5; 3 SOLUTION RESPIRATORY (INHALATION) at 01:07

## 2017-04-07 NOTE — PDOCDIS
Discharge Instructions


DIAGNOSIS


Discharge Diagnosis:  Acute tracheobronchitis.





CONDITION


Patient Condition:  Stable





HOME CARE INSTRUCTIONS:


Special Diet:  regular, preferably low-cholesterol





FOLLOW UP/APPOINTMENTS


Appointments


Dereck Pat MD 


Specialty: Internal Medicine 


Office Address: [address] [place] [address]


[address] [place], [place] [id number] 


Office Telephone: [phone]





OTHER ORDERS:


Other Orders:


1.  Regular, preferably low-cholesterol diet.


2.  Continue HIV medications.  Complete the course of antibiotics.


3.  Resume activities as tolerated.


4.  Follow-up with your primary care physician in 2 weeks.  If you do not have 

a primary care physician, please call Dr. Dereck Pat's office.


5.  Go to the nearest emergency room if you have worsening shortness of breath, 

persistent fevers, or any other unusual signs/symptoms.











PATRICIA GURROLA NP Apr 7, 2017 11:10

## 2017-04-07 NOTE — DS
DATE OF ADMISSION: 04/05/2017



DATE OF DISCHARGE: 04/07/2017

 

FINAL DIAGNOSES:

1.  Acute tracheobronchitis.

2.  Dyslipidemia with elevated triglycerides and suboptimal LDL.

3.  Chronic obstructive pulmonary disease.

4.  Nicotine use.

5.  Human immunodeficiency virus positive status.

 

CONSULTATIONS:

1.  Dr. Jerrold S. Dreyer, Infectious Disease.

2.  Brian Garcia NP for Infectious Disease.

 

HOSPITAL COURSE:  This is a 51-year-old  male with past medical history 
of human immunodeficiency virus.  He presents with a complaint of fever, chills 
and cough for a 1-week period.  The patient verbalized that this is his fourth 
visit to the ER in the past 4 months.  The patient has been compliant with his 
HIV medications.  In the emergency room, the patient was noticed to have a WBC 
of 17.9.  The patient's chest x-ray showed  a mild nodular airspace disease 
over the right mid lung zone.  Provided the patient's history of present illness
, his comorbidities and the diagnostic findings, a clinical decision was made 
to admit the patient to have him further evaluated.

 

The patient was admitted to inpatient medical/surgical floor.  The patient was 
started on empiric antibiotics.  Pancultures were ordered.  Infectious disease 
consult was called on this patient.  The patient had a chest CT scan done to 
further evaluate the chest x-ray findings.  The patient's chest CT showed no 
evidence of any pulmonary nodules.  However, it showed multiple blebs involving 
the peripheral upper lungs and apical regions and  dependent atelectasis along 
with mild paratracheal lymphadenopathy.  Hence, it was concluded that the 
patient has underlying emphysema, most probably secondary to his chronic 
smoking.  The patient was maintained on inhaled bronchodilators.  The patient 
was confirmed to have possible underlying acute tracheobronchitis.  There was 
no evidence of any infiltrate to suggest pneumonia on the chest CT scan.  As 
mentioned earlier, the patient has HIV.  The patient's latest CD4 count as per 
the patient is 1400 and therefore, there was no suspicion for any pneumocystis 
pneumonia.  The patient's influenza A and B screen was negative.  Urine and 
blood cultures were negative.  The patient's symptoms improved with the 
treatment strategy.  The patient was incidentally noted to have dyslipidemia 
with elevated triglycerides and suboptimal LDL.  The patient was advised on a 
low cholesterol diet. The patient had a stable hospital course.  The patient is 
clinically stable to be discharged home.  The patient denied any complaints at 
the time of discharge.

 

DISPOSITION  AND PLAN:  The patient will be discharged home today.  The patient 
was instructed to follow a regular, preferably low-cholesterol diet as 
tolerated.  The patient was instructed to continue HIV medications and to 
complete the course of antibiotics.  He was instructed to resume activities as 
tolerated.  The patient was instructed to follow up with his primary care 
physician in 2 weeks, and if he does not have a primary care physician,  to 
please call Dr. Dereck Pat's office.  The patient was instructed to go to the 
nearest emergency room if he has any worsening shortness of breath, worsening 
fevers or any other unusual signs/symptoms.  The patient was advised to quit 
smoking.  The patient verbalized understanding of his discharge instructions.

 

CONDITION AT DISCHARGE:  Stable.

 

DISCHARGE MEDICATIONS:

1.  Levaquin 500 mg p.o. daily for 7 days.

2.  ProAir HFA 0.5 grams 2 puffs q.6h.  p.r.n. dyspnea.

3.  Stribild tablet,  1 tablet p.o. daily.

 

PERTINENT LABORATORY AND DIAGNOSTIC DATA:

1.  Blood cultures x2 negative.

2.  Urine culture negative.  

3.  Influenza A and B screen negative.

4.  Fasting lipid panel:  Triglycerides 175, total cholesterol 188, , 
AST of 35, hemoglobin A1c 5.5.

5.  Latest CBC:  WBC 11.0, hemoglobin 13.1, hematocrit 38.2, platelet count 
217.  

6.  Latest BMP:  Sodium 142, potassium 4.3, chloride 107, carbon dioxide 23, 
anion gap 16, BUN 12, creatinine 0.8, glucose 91, calcium 9.1, phosphorus 4.8, 
magnesium 2.1.

7.  Chest x-ray upon admission:  Mild nodular airspace disease over the right 
mid lung zone. 

8.  Chest CT scan:  No evidence of pulmonary nodules.  Very mild paratracheal 
lymphadenopathy.  No evidence of hilar or axillary lymphadenopathy.  Multiple 
blebs involving the peripheral upper lungs and apical regions.  Dependent 
atelectasis.  No evidence of thoracic effusions.

 

At this time, I would like to thank all the consultants for seeing the patient 
and providing clinical recommendations.

 

The case and management of this patient was fully discussed with Dr. Burns.  



Approximately 35 minutes was spent in coordinating the discharge of this 
patient.

 

 

 

PATRICIA BURNS MD, AM/[facility]

DD:    04/07/2017 11:48:43

DT:    04/07/2017 12:22:58

Conf#: [id number]

DID#:  [id number]

 

MTDD

## 2018-01-03 ENCOUNTER — HOSPITAL ENCOUNTER (EMERGENCY)
Dept: HOSPITAL 91 - FTE | Age: 53
Discharge: HOME | End: 2018-01-03
Payer: MEDICAID

## 2018-01-03 ENCOUNTER — HOSPITAL ENCOUNTER (EMERGENCY)
Age: 53
Discharge: HOME | End: 2018-01-03

## 2018-01-03 DIAGNOSIS — F17.210: ICD-10-CM

## 2018-01-03 DIAGNOSIS — J06.9: Primary | ICD-10-CM

## 2018-01-03 PROCEDURE — 87400 INFLUENZA A/B EACH AG IA: CPT

## 2018-01-03 PROCEDURE — 99284 EMERGENCY DEPT VISIT MOD MDM: CPT

## 2018-01-03 PROCEDURE — 71045 X-RAY EXAM CHEST 1 VIEW: CPT

## 2018-01-03 RX ADMIN — ACETAMINOPHEN 1 MG: 325 TABLET, FILM COATED ORAL at 20:39

## 2018-03-19 ENCOUNTER — HOSPITAL ENCOUNTER (EMERGENCY)
Age: 53
Discharge: HOME | End: 2018-03-19

## 2018-03-19 ENCOUNTER — HOSPITAL ENCOUNTER (EMERGENCY)
Dept: HOSPITAL 91 - FTE | Age: 53
Discharge: HOME | End: 2018-03-19
Payer: MEDICAID

## 2018-03-19 DIAGNOSIS — M25.562: Primary | ICD-10-CM

## 2018-03-19 DIAGNOSIS — F17.210: ICD-10-CM

## 2018-03-19 PROCEDURE — 73562 X-RAY EXAM OF KNEE 3: CPT

## 2018-03-19 PROCEDURE — 93971 EXTREMITY STUDY: CPT

## 2018-03-19 PROCEDURE — 73590 X-RAY EXAM OF LOWER LEG: CPT

## 2018-03-19 PROCEDURE — 29505 APPLICATION LONG LEG SPLINT: CPT

## 2018-03-19 PROCEDURE — 99285 EMERGENCY DEPT VISIT HI MDM: CPT

## 2018-03-19 PROCEDURE — 73550: CPT

## 2018-03-19 PROCEDURE — 96372 THER/PROPH/DIAG INJ SC/IM: CPT

## 2018-03-19 RX ADMIN — KETOROLAC TROMETHAMINE 1 MG: 30 INJECTION, SOLUTION INTRAMUSCULAR at 19:28

## 2018-05-24 ENCOUNTER — HOSPITAL ENCOUNTER (EMERGENCY)
Age: 53
Discharge: HOME | End: 2018-05-24

## 2018-05-24 ENCOUNTER — HOSPITAL ENCOUNTER (EMERGENCY)
Dept: HOSPITAL 91 - FTE | Age: 53
Discharge: HOME | End: 2018-05-24
Payer: SELF-PAY

## 2018-05-24 DIAGNOSIS — M25.562: Primary | ICD-10-CM

## 2018-05-24 DIAGNOSIS — F17.210: ICD-10-CM

## 2018-05-24 PROCEDURE — 96372 THER/PROPH/DIAG INJ SC/IM: CPT

## 2018-05-24 PROCEDURE — 29505 APPLICATION LONG LEG SPLINT: CPT

## 2018-05-24 PROCEDURE — 99284 EMERGENCY DEPT VISIT MOD MDM: CPT

## 2018-05-24 RX ADMIN — HYDROCODONE BITARTRATE AND ACETAMINOPHEN 1 TAB: 5; 325 TABLET ORAL at 17:38

## 2018-05-24 RX ADMIN — KETOROLAC TROMETHAMINE 1 MG: 30 INJECTION, SOLUTION INTRAMUSCULAR at 17:39

## 2018-05-26 ENCOUNTER — HOSPITAL ENCOUNTER (EMERGENCY)
Dept: HOSPITAL 91 - E/R | Age: 53
Discharge: HOME | End: 2018-05-26
Payer: MEDICAID

## 2018-05-26 ENCOUNTER — HOSPITAL ENCOUNTER (EMERGENCY)
Age: 53
Discharge: HOME | End: 2018-05-26

## 2018-05-26 DIAGNOSIS — K59.00: Primary | ICD-10-CM

## 2018-05-26 DIAGNOSIS — F17.210: ICD-10-CM

## 2018-05-26 LAB
ADD MAN DIFF?: NO
ADD UMIC: YES
ALANINE AMINOTRANSFERASE: 17 IU/L (ref 13–69)
ALBUMIN/GLOBULIN RATIO: 1.36
ALBUMIN: 4.5 G/DL (ref 3.3–4.9)
ALKALINE PHOSPHATASE: 101 IU/L (ref 42–121)
ANION GAP: 15 (ref 8–16)
ASPARTATE AMINO TRANSFERASE: 24 IU/L (ref 15–46)
BASOPHIL #: 0.1 10^3/UL (ref 0–0.1)
BASOPHILS %: 0.4 % (ref 0–2)
BILIRUBIN,DIRECT: 0 MG/DL (ref 0–0.2)
BILIRUBIN,TOTAL: 0.2 MG/DL (ref 0.2–1.3)
BLOOD UREA NITROGEN: 14 MG/DL (ref 7–20)
CALCIUM: 9.3 MG/DL (ref 8.4–10.2)
CARBON DIOXIDE: 27 MMOL/L (ref 21–31)
CHLORIDE: 108 MMOL/L (ref 97–110)
CREATININE: 0.9 MG/DL (ref 0.61–1.24)
EOSINOPHILS #: 0.1 10^3/UL (ref 0–0.5)
EOSINOPHILS %: 1 % (ref 0–7)
GLOBULIN: 3.3 G/DL (ref 1.3–3.2)
GLUCOSE: 97 MG/DL (ref 70–220)
HEMATOCRIT: 38 % (ref 42–52)
HEMOGLOBIN: 13.2 G/DL (ref 14–18)
LIPASE: 79 U/L (ref 23–300)
LYMPHOCYTES #: 4.4 10^3/UL (ref 0.8–2.9)
LYMPHOCYTES %: 38.1 % (ref 15–51)
MEAN CORPUSCULAR HEMOGLOBIN: 32.4 PG (ref 29–33)
MEAN CORPUSCULAR HGB CONC: 34.7 G/DL (ref 32–37)
MEAN CORPUSCULAR VOLUME: 93.1 FL (ref 82–101)
MEAN PLATELET VOLUME: 9.8 FL (ref 7.4–10.4)
MONOCYTE #: 0.7 10^3/UL (ref 0.3–0.9)
MONOCYTES %: 6.4 % (ref 0–11)
NEUTROPHIL #: 6.2 10^3/UL (ref 1.6–7.5)
NEUTROPHILS %: 53.8 % (ref 39–77)
NUCLEATED RED BLOOD CELLS #: 0 10^3/UL (ref 0–0)
NUCLEATED RED BLOOD CELLS%: 0 /100WBC (ref 0–0)
PLATELET COUNT: 234 10^3/UL (ref 140–415)
POTASSIUM: 4.3 MMOL/L (ref 3.5–5.1)
RED BLOOD COUNT: 4.08 10^6/UL (ref 4.7–6.1)
RED CELL DISTRIBUTION WIDTH: 12.4 % (ref 11.5–14.5)
SODIUM: 146 MMOL/L (ref 135–144)
TOTAL PROTEIN: 7.8 G/DL (ref 6.1–8.1)
UR ASCORBIC ACID: NEGATIVE MG/DL
UR BILIRUBIN (DIP): NEGATIVE MG/DL
UR BLOOD (DIP): (no result) MG/DL
UR CLARITY: CLEAR
UR COLOR: YELLOW
UR GLUCOSE (DIP): NEGATIVE MG/DL
UR KETONES (DIP): NEGATIVE MG/DL
UR LEUKOCYTE ESTERASE (DIP): NEGATIVE LEU/UL
UR NITRITE (DIP): NEGATIVE MG/DL
UR PH (DIP): 6 (ref 5–9)
UR RBC: 3 /HPF (ref 0–5)
UR SPECIFIC GRAVITY (DIP): 1.01 (ref 1–1.03)
UR TOTAL PROTEIN (DIP): NEGATIVE MG/DL
UR UROBILINOGEN (DIP): NEGATIVE MG/DL
UR WBC: 0 /HPF (ref 0–5)
WHITE BLOOD COUNT: 11.5 10^3/UL (ref 4.8–10.8)

## 2018-05-26 PROCEDURE — 96376 TX/PRO/DX INJ SAME DRUG ADON: CPT

## 2018-05-26 PROCEDURE — 71045 X-RAY EXAM CHEST 1 VIEW: CPT

## 2018-05-26 PROCEDURE — 99285 EMERGENCY DEPT VISIT HI MDM: CPT

## 2018-05-26 PROCEDURE — 85025 COMPLETE CBC W/AUTO DIFF WBC: CPT

## 2018-05-26 PROCEDURE — 83690 ASSAY OF LIPASE: CPT

## 2018-05-26 PROCEDURE — 96374 THER/PROPH/DIAG INJ IV PUSH: CPT

## 2018-05-26 PROCEDURE — 74018 RADEX ABDOMEN 1 VIEW: CPT

## 2018-05-26 PROCEDURE — 80053 COMPREHEN METABOLIC PANEL: CPT

## 2018-05-26 PROCEDURE — 74176 CT ABD & PELVIS W/O CONTRAST: CPT

## 2018-05-26 PROCEDURE — 36415 COLL VENOUS BLD VENIPUNCTURE: CPT

## 2018-05-26 PROCEDURE — 81001 URINALYSIS AUTO W/SCOPE: CPT

## 2018-05-26 RX ADMIN — HYDROMORPHONE HYDROCHLORIDE 1 MG: 2 INJECTION INTRAMUSCULAR; INTRAVENOUS; SUBCUTANEOUS at 13:44

## 2018-05-26 RX ADMIN — ONDANSETRON 1 MG: 4 TABLET, ORALLY DISINTEGRATING ORAL at 13:44

## 2018-05-26 RX ADMIN — THIAMINE HYDROCHLORIDE 1 MLS/HR: 100 INJECTION, SOLUTION INTRAMUSCULAR; INTRAVENOUS at 13:44

## 2018-05-26 RX ADMIN — HYDROMORPHONE HYDROCHLORIDE 1 MG: 1 INJECTION, SOLUTION INTRAMUSCULAR; INTRAVENOUS; SUBCUTANEOUS at 15:31

## 2018-09-28 ENCOUNTER — HOSPITAL ENCOUNTER (EMERGENCY)
Age: 53
Discharge: HOME | End: 2018-09-28

## 2018-09-28 ENCOUNTER — HOSPITAL ENCOUNTER (EMERGENCY)
Dept: HOSPITAL 91 - FTE | Age: 53
Discharge: HOME | End: 2018-09-28
Payer: MEDICAID

## 2018-09-28 DIAGNOSIS — H00.012: Primary | ICD-10-CM

## 2018-09-28 DIAGNOSIS — F17.210: ICD-10-CM

## 2018-09-28 PROCEDURE — 99283 EMERGENCY DEPT VISIT LOW MDM: CPT

## 2019-11-08 ENCOUNTER — HOSPITAL ENCOUNTER (EMERGENCY)
Dept: HOSPITAL 10 - E/R | Age: 54
Discharge: HOME | End: 2019-11-08
Payer: MEDICAID

## 2019-11-08 VITALS
WEIGHT: 177.69 LBS | WEIGHT: 177.69 LBS | BODY MASS INDEX: 27.89 KG/M2 | HEIGHT: 67 IN | BODY MASS INDEX: 27.89 KG/M2 | HEIGHT: 67 IN

## 2019-11-08 VITALS — DIASTOLIC BLOOD PRESSURE: 81 MMHG | SYSTOLIC BLOOD PRESSURE: 116 MMHG | HEART RATE: 80 BPM | RESPIRATION RATE: 16 BRPM

## 2019-11-08 DIAGNOSIS — F17.210: ICD-10-CM

## 2019-11-08 DIAGNOSIS — I10: ICD-10-CM

## 2019-11-08 DIAGNOSIS — R07.9: Primary | ICD-10-CM

## 2019-11-08 DIAGNOSIS — B20: ICD-10-CM

## 2019-11-08 PROCEDURE — 80053 COMPREHEN METABOLIC PANEL: CPT

## 2019-11-08 PROCEDURE — 71275 CT ANGIOGRAPHY CHEST: CPT

## 2019-11-08 PROCEDURE — 93005 ELECTROCARDIOGRAM TRACING: CPT

## 2019-11-08 PROCEDURE — 84484 ASSAY OF TROPONIN QUANT: CPT

## 2019-11-08 PROCEDURE — 83880 ASSAY OF NATRIURETIC PEPTIDE: CPT

## 2019-11-08 PROCEDURE — 71045 X-RAY EXAM CHEST 1 VIEW: CPT

## 2019-11-08 PROCEDURE — 85025 COMPLETE CBC W/AUTO DIFF WBC: CPT

## 2019-11-08 PROCEDURE — 96374 THER/PROPH/DIAG INJ IV PUSH: CPT
